# Patient Record
Sex: FEMALE | Race: WHITE | NOT HISPANIC OR LATINO
[De-identification: names, ages, dates, MRNs, and addresses within clinical notes are randomized per-mention and may not be internally consistent; named-entity substitution may affect disease eponyms.]

---

## 2019-11-15 PROBLEM — Z00.00 ENCOUNTER FOR PREVENTIVE HEALTH EXAMINATION: Status: ACTIVE | Noted: 2019-11-15

## 2019-11-18 ENCOUNTER — APPOINTMENT (OUTPATIENT)
Dept: OTOLARYNGOLOGY | Facility: CLINIC | Age: 59
End: 2019-11-18
Payer: COMMERCIAL

## 2019-11-18 VITALS
BODY MASS INDEX: 26.13 KG/M2 | WEIGHT: 142 LBS | OXYGEN SATURATION: 98 % | HEIGHT: 62 IN | DIASTOLIC BLOOD PRESSURE: 79 MMHG | SYSTOLIC BLOOD PRESSURE: 127 MMHG | HEART RATE: 105 BPM

## 2019-11-18 DIAGNOSIS — E04.1 NONTOXIC SINGLE THYROID NODULE: ICD-10-CM

## 2019-11-18 DIAGNOSIS — Z78.9 OTHER SPECIFIED HEALTH STATUS: ICD-10-CM

## 2019-11-18 DIAGNOSIS — Z82.49 FAMILY HISTORY OF ISCHEMIC HEART DISEASE AND OTHER DISEASES OF THE CIRCULATORY SYSTEM: ICD-10-CM

## 2019-11-18 DIAGNOSIS — Z82.3 FAMILY HISTORY OF STROKE: ICD-10-CM

## 2019-11-18 PROCEDURE — 99203 OFFICE O/P NEW LOW 30 MIN: CPT | Mod: 25

## 2019-11-18 PROCEDURE — 31575 DIAGNOSTIC LARYNGOSCOPY: CPT

## 2019-11-18 NOTE — PHYSICAL EXAM
[Laryngoscopy Performed] : laryngoscopy was performed, see procedure section for findings [Normal] : orientation to person, place, and time: normal [de-identified] : L > R thyroid lobe, L thyroid fullness, no discrete nodule palpated

## 2019-11-18 NOTE — HISTORY OF PRESENT ILLNESS
[de-identified] : 59F referred for 3.5 cm left thyroid nodule detected during a physical exam for URI/sinus symptoms about a week ago.  \par \par 11/11/19 US neck: \par Right thyroid lobe measures 4.8 x 1.0 x 1.7 cm with 0.3 cm lower pole nodule\par Left thyroid lobe measures 5.3 x 2.5 x 1.6 cm with 3.5 x 2.5 x 2.3 cm lower pole nodule.\par Isthmus measures 0.2 cm in thickness.\par No pathologically enlarged regional lymph nodes or incidental significant findings.\par \par Denies family hx of thyroid dysfunction/cancer, denies personal hx of radiation exposure, denies hypo or hyperthyroid symptoms, denies compressive symptoms.  Reports she has had a hoarse voice since childhood.

## 2019-11-18 NOTE — ASSESSMENT
[FreeTextEntry1] : 59F with 3.5 cm left thyroid nodule and 3 mm right thyroid nodule.\par \par Plan:\par - TFTs\par - FNA of 3.5 cm left thyroid nodule with Afirma.\par - Will contact pt with results and plan of care.\par - Pt verbalized understanding of above.

## 2019-12-03 ENCOUNTER — FORM ENCOUNTER (OUTPATIENT)
Age: 59
End: 2019-12-03

## 2019-12-04 ENCOUNTER — RESULT REVIEW (OUTPATIENT)
Age: 59
End: 2019-12-04

## 2019-12-04 ENCOUNTER — OUTPATIENT (OUTPATIENT)
Dept: OUTPATIENT SERVICES | Facility: HOSPITAL | Age: 59
LOS: 1 days | End: 2019-12-04
Payer: COMMERCIAL

## 2019-12-04 ENCOUNTER — APPOINTMENT (OUTPATIENT)
Dept: ULTRASOUND IMAGING | Facility: HOSPITAL | Age: 59
End: 2019-12-04
Payer: COMMERCIAL

## 2019-12-04 PROCEDURE — 88173 CYTOPATH EVAL FNA REPORT: CPT

## 2019-12-04 PROCEDURE — 10005 FNA BX W/US GDN 1ST LES: CPT

## 2019-12-04 PROCEDURE — 88305 TISSUE EXAM BY PATHOLOGIST: CPT | Mod: 26

## 2019-12-04 PROCEDURE — 88173 CYTOPATH EVAL FNA REPORT: CPT | Mod: 26

## 2019-12-04 PROCEDURE — 88305 TISSUE EXAM BY PATHOLOGIST: CPT

## 2019-12-05 LAB — NON-GYNECOLOGICAL CYTOLOGY STUDY: SIGNIFICANT CHANGE UP

## 2019-12-11 LAB
T3 SERPL-MCNC: 95 NG/DL
T3FREE SERPL-MCNC: 2.83 PG/ML
T4 FREE SERPL-MCNC: 1.1 NG/DL
T4 SERPL-MCNC: 6 UG/DL
THYROGLOB AB SERPL-ACNC: <20 IU/ML
THYROPEROXIDASE AB SERPL IA-ACNC: 12.1 IU/ML
TSH SERPL-ACNC: 2.99 UIU/ML

## 2020-01-08 ENCOUNTER — APPOINTMENT (OUTPATIENT)
Dept: OTOLARYNGOLOGY | Facility: CLINIC | Age: 60
End: 2020-01-08

## 2021-09-27 ENCOUNTER — APPOINTMENT (OUTPATIENT)
Dept: OTOLARYNGOLOGY | Facility: CLINIC | Age: 61
End: 2021-09-27